# Patient Record
Sex: FEMALE | Race: WHITE | NOT HISPANIC OR LATINO | Employment: FULL TIME | URBAN - METROPOLITAN AREA
[De-identification: names, ages, dates, MRNs, and addresses within clinical notes are randomized per-mention and may not be internally consistent; named-entity substitution may affect disease eponyms.]

---

## 2022-11-07 ENCOUNTER — OFFICE VISIT (OUTPATIENT)
Dept: URGENT CARE | Facility: CLINIC | Age: 62
End: 2022-11-07

## 2022-11-07 VITALS — OXYGEN SATURATION: 96 % | RESPIRATION RATE: 14 BRPM | TEMPERATURE: 103 F | HEART RATE: 99 BPM

## 2022-11-07 DIAGNOSIS — B34.9 VIRAL INFECTION: ICD-10-CM

## 2022-11-07 DIAGNOSIS — R50.9 FEVER, UNSPECIFIED: Primary | ICD-10-CM

## 2022-11-07 RX ORDER — ALBUTEROL SULFATE 90 UG/1
AEROSOL, METERED RESPIRATORY (INHALATION)
COMMUNITY
Start: 2022-09-15

## 2022-11-07 RX ORDER — ACETAMINOPHEN 325 MG/1
975 TABLET ORAL ONCE
Status: COMPLETED | OUTPATIENT
Start: 2022-11-07 | End: 2022-11-07

## 2022-11-07 RX ORDER — CLONAZEPAM 0.5 MG/1
TABLET ORAL
COMMUNITY
Start: 2022-10-25

## 2022-11-07 RX ORDER — OSELTAMIVIR PHOSPHATE 75 MG/1
75 CAPSULE ORAL EVERY 12 HOURS SCHEDULED
Qty: 10 CAPSULE | Refills: 0 | Status: SHIPPED | OUTPATIENT
Start: 2022-11-07 | End: 2022-11-12

## 2022-11-07 RX ADMIN — ACETAMINOPHEN 975 MG: 325 TABLET ORAL at 17:40

## 2022-11-07 NOTE — PROGRESS NOTES
[facility]        NAME: Lilia Worthington is a 63 y o  female  : 1960    MRN: [id number]  DATE: 2022  TIME: 6:49 PM    Assessment and Plan   Fever, unspecified [R50 9]  1  Fever, unspecified  acetaminophen (TYLENOL) tablet 975 mg    oseltamivir (TAMIFLU) 75 mg capsule    Covid/Flu-Office Collect   2  Viral infection  oseltamivir (TAMIFLU) 75 mg capsule    Covid/Flu-Office Collect     Discussed strict return to care precautions as well as red flag symptoms which should prompt immediate ED referral  Pt verbalized understanding and is in agreement with plan  Please follow up with your primary care provider within the next week  Please remember that your visit today was with an urgent care provider and should not replace follow up with your primary care provider for chronic medical issues or annual physicals  (Directly from [employer] website)  IF YOU TEST POSITIVE FOR COVID-19:  If you have symptoms, you can end isolation after 5 full days if you are fever-free for 24 hours without the use of fever-reducing medication and your other symptoms have improved  Loss of taste and/or smell may persist for weeks or more and should not delay the end of isolation  Your first day of symptoms is DAY ZERO  You should continue to wear a well-fitting mask (like N95, [id number]) both at home and in public for 5 additional days  Avoid people who are at high risk for severe disease for at least 10 days  DO NOT go to places where you are unable to wear a mask until a full 10 days from your first symptom  If you have no symptoms, quarantine for 5 days from the day you were tested  The day you were tested is DAY ZERO  Continue wearing a mask around others until day 10  If you develop symptoms, your 5-day isolation period starts over  If you have/had severe symptoms and/or a compromised immune system, you may need to isolate longer   Consult with your primary care provider about when you can resume being around other people  IF YOU HAVE HAD CLOSE EXPOSURE:  QUARANTINE IF: You are ages 25 or older and either have not been vaccinated, or have been vaccinated with your primary series but not the booster  You must quarantine for at least 5 days after your last contact  If you develop symptoms, get tested immediately  If you do not develop symptoms, get tested at least 5 days after your last exposure  Avoid people who are immunocompromised at high risk for severe disease until at least after 10 days  YOU DO NOT HAVE TO QUARANTINE IF:   • You are 18 years or older and have received all recommended vaccine doses, including boosters and additional primary shots for some immunocompromised people  • You are ages 9-17 and completed the primary series of COVID-19 vaccines  • You had confirmed COVID-19 within the last 90 days on a viral test   You should still wear a well-fitting mask around others for 10 days from the date of your last close exposure to COVID-19, and get tested at least 5 days later  Quarantine and isolation guidelines differ for healthcare professionals  Patient Instructions       Follow up with PCP in 3-5 days  Proceed to  ER if symptoms worsen  Chief Complaint     Chief Complaint   Patient presents with   • Cold Like Symptoms     Pt presents with cough, irritated throat, fatigue, fever; took Marry Ganong this morning;started on Friday         History of Present Illness       Chelo Murillo is a(n) 63 y o  female presenting with URI symptoms x 2 days  Past medical history: anxiety, depression  Congestion: yes  Sore throat: yes  Cough: yes  Sputum production: no  Fever: yes, 103F here  Body aches: yes  Loss of smell/taste: no  GI symptoms: 2 episodes diarrhea, decreased appetite, nausea but no vomiting  Known sick contacts: yes, boss  OTC meds tried: advil this morning, mucinex DM        Review of Systems   Review of Systems   Constitutional: Positive for chills, diaphoresis, fatigue and fever     HENT: Positive for congestion and sore throat  Negative for ear pain, postnasal drip, rhinorrhea, sinus pain, sneezing and trouble swallowing  Eyes: Negative for pain and redness  Respiratory: Positive for cough  Negative for chest tightness, shortness of breath and wheezing  Cardiovascular: Negative for chest pain and leg swelling  Gastrointestinal: Positive for diarrhea and nausea  Negative for vomiting  Musculoskeletal: Positive for myalgias  Neurological: Positive for weakness and headaches  Negative for dizziness  Current Medications       Current Outpatient Medications:   •  albuterol (PROVENTIL HFA,VENTOLIN HFA) 90 mcg/act inhaler, TAKE 2 PUFFS BY MOUTH EVERY 4 TO 6 HOURS AS NEEDED, Disp: , Rfl:   •  clonazePAM (KlonoPIN) 0 5 mg tablet, TAKE 1/2 TO 1 TABLET BY MOUTH AT BEDTIME AS NEEDED INSOMNIA DON'T DRIVE OR MIX WITH ALCOHOL, Disp: , Rfl:   •  oseltamivir (TAMIFLU) 75 mg capsule, Take 1 capsule (75 mg total) by mouth every 12 (twelve) hours for 5 days, Disp: 10 capsule, Rfl: 0  •  sertraline (ZOLOFT) 50 mg tablet, Take 50 mg by mouth daily, Disp: , Rfl:   No current facility-administered medications for this visit  Current Allergies     Allergies as of 11/07/2022 - Reviewed 11/07/2022   Allergen Reaction Noted   • Bactrim [sulfamethoxazole-trimethoprim] Hives 11/07/2022   • Levaquin [levofloxacin] Itching 11/07/2022            The following portions of the patient's history were reviewed and updated as appropriate: allergies, current medications, past family history, past medical history, past social history, past surgical history and problem list      Past Medical History:   Diagnosis Date   • Anxiety    • Depression        No past surgical history on file  History reviewed  No pertinent family history  Medications have been verified  Objective   Pulse 99   Temp (!) 103 °F (39 4 °C)   Resp 14   SpO2 96%        Physical Exam     Physical Exam  Vitals and nursing note reviewed  Constitutional:       General: She is not in acute distress  Appearance: Normal appearance  She is ill-appearing  She is not toxic-appearing  HENT:      Head: Normocephalic and atraumatic  Right Ear: Tympanic membrane, ear canal and external ear normal       Left Ear: Tympanic membrane, ear canal and external ear normal       Nose: No congestion  Mouth/Throat:      Mouth: Mucous membranes are moist       Pharynx: Oropharynx is clear  No oropharyngeal exudate or posterior oropharyngeal erythema  Eyes:      Conjunctiva/sclera: Conjunctivae normal       Pupils: Pupils are equal, round, and reactive to light  Cardiovascular:      Rate and Rhythm: Normal rate and regular rhythm  Heart sounds: Normal heart sounds  Pulmonary:      Effort: Pulmonary effort is normal  No respiratory distress  Breath sounds: Normal breath sounds  No wheezing, rhonchi or rales  Abdominal:      General: Abdomen is flat  Palpations: Abdomen is soft  Musculoskeletal:      Cervical back: Normal range of motion and neck supple  Skin:     General: Skin is warm and dry  Capillary Refill: Capillary refill takes less than 2 seconds  Neurological:      Mental Status: She is alert and oriented to person, place, and time     Psychiatric:         Behavior: Behavior normal

## 2022-11-07 NOTE — LETTER
November 7, 2022     Patient: Kaya Vasques   YOB: 1960   Date of Visit: 11/7/2022       To Whom it May Concern:    Kaya Vasques was seen in my clinic on 11/7/2022  She may return to work on 11/12/2022  She is being tested for COVID-19 and influenza  If you have any questions or concerns, please don't hesitate to call           Sincerely,          Jose Wiley PA-C        CC: No Recipients

## 2022-11-08 LAB
FLUAV RNA RESP QL NAA+PROBE: NEGATIVE
FLUBV RNA RESP QL NAA+PROBE: NEGATIVE
SARS-COV-2 RNA RESP QL NAA+PROBE: POSITIVE

## 2025-02-03 ENCOUNTER — TELEPHONE (OUTPATIENT)
Age: 65
End: 2025-02-03